# Patient Record
Sex: MALE
[De-identification: names, ages, dates, MRNs, and addresses within clinical notes are randomized per-mention and may not be internally consistent; named-entity substitution may affect disease eponyms.]

---
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
## 2019-02-13 ENCOUNTER — HOSPITAL ENCOUNTER (EMERGENCY)
Dept: HOSPITAL 92 - ERS | Age: 19
Discharge: HOME | End: 2019-02-13
Payer: SELF-PAY

## 2019-02-13 DIAGNOSIS — M54.5: Primary | ICD-10-CM

## 2019-02-13 PROCEDURE — 96372 THER/PROPH/DIAG INJ SC/IM: CPT
